# Patient Record
Sex: MALE | Race: WHITE
[De-identification: names, ages, dates, MRNs, and addresses within clinical notes are randomized per-mention and may not be internally consistent; named-entity substitution may affect disease eponyms.]

---

## 2018-06-06 ENCOUNTER — HOSPITAL ENCOUNTER (EMERGENCY)
Dept: HOSPITAL 80 - FED | Age: 73
Discharge: HOME | End: 2018-06-06
Payer: COMMERCIAL

## 2018-06-06 VITALS — DIASTOLIC BLOOD PRESSURE: 69 MMHG | SYSTOLIC BLOOD PRESSURE: 124 MMHG

## 2018-06-06 DIAGNOSIS — G43.809: Primary | ICD-10-CM

## 2018-06-06 DIAGNOSIS — E86.9: ICD-10-CM

## 2018-06-06 LAB — PLATELET # BLD: 195 10^3/UL (ref 150–400)

## 2018-06-06 PROCEDURE — 96375 TX/PRO/DX INJ NEW DRUG ADDON: CPT

## 2018-06-06 PROCEDURE — 96374 THER/PROPH/DIAG INJ IV PUSH: CPT

## 2018-06-06 PROCEDURE — 96361 HYDRATE IV INFUSION ADD-ON: CPT

## 2018-06-06 PROCEDURE — 70450 CT HEAD/BRAIN W/O DYE: CPT

## 2018-06-06 PROCEDURE — 99285 EMERGENCY DEPT VISIT HI MDM: CPT

## 2018-06-06 NOTE — EDPHY
HPI/HX/ROS/PE/MDM


Narrative: 





CHIEF COMPLAINT: Visual changes, headache





HPI: The patient is a 73 y/o male with a history of ocular migraines and sleep 

apnea complaining of 2 ocular migraines today. He usually develops migraines 

when he is dehydrated and reports it has been a long time since he has had an 

ocular migraine. Usually with ocular migraines, he sees bright colors and a a 

"fortress wall" pattern. He awoke today feeling "lousy". This morning, as he 

was leaving his gentle movement training class, he noticed a light shimmering 

in his vision. He sat down and took two aspirins before driving home. On the 

way home he stopped at this ED and sat in the waiting room. His symptoms 

improved in 20 minutes so he continued to drive home. He developed a headache 

on the drive home. This evening, he decided to get takeout. As he was driving 

to the restaurant, he noticed he was having trouble reading road signs, 

starting with his peripheral vision. His left eye was better than his right. 

The blurriness progressed, prompting his visit. He denies any other associated 

symptoms. He reports he has never had two ocular migraines in one day and he 

has never had an ocular migraine without his bright colors and "fortress wall" 

pattern. 





REVIEW OF SYSTEMS:


Aside from elements discussed in the HPI, a comprehensive 10-point review of 

systems was reviewed and is negative.





PMH: Sleep apnea, ocular migraines





SOCIAL HISTORY: , lives in [place], retired, worked at [employer]





PHYSICAL EXAM:


General:Patient is alert, in no acute distress.


ENT:Eyes are normal to inspection.  ENT inspection normal.  No bruit.  PERRL. 

Sclera normal.  EOMI. 


Neck: Normal inspection.  Full range of motion.


Respiratory:No respiratory distress.  Breath sounds normal bilaterally.


Cardiovascular: Regular rate and rhythm.  Strong peripheral pulses.  Normal cap 

refill.


Abdomen:The abdomen is nontender to palpation. There are no peritoneal signs. 

There are normal bowel sounds.


Back: Normal to inspection.  No tenderness to palpation.


Skin: Normal color.  No rash.  Warm and dry.


Extremities: Normal appearance.  Full range of motion.


Neuro: Oriented x3.  Normal motor function.  Normal sensory function. No 

pronator drift.  Normal finger to nose bilaterally.  Normal gait. CNs II-X11 

normal. 








ED Course: 





The patient presents with his second ocular migraine today. Usually he has 

ocular migraines when dehydrated. He reports it is unusual to have two ocular 

migraines in one day. Plan for CBC, basic metabolic panel, head CT, 10 mg 

Reglan IV, 30 mg Toradol IV, 25 mg Benadryl IV, and 1 L IV NS fluids.





8:20 PM- The patient's CT was negative and labs are unremarkable. I reassessed 

him and his symptoms have completely resolved. We discussed option of further 

imaging to include MRI and/or CTA.  The patient declines further testing and 

wants to go home. He understands I cannot 100% stroke or dissection or other 

pathology without further testing. 


MDM: 





This patient presents with two episodes of what appear to be consistent with 

prior ocular migraines. He has no history of trauma or manipulation.  His 

symptoms completely resolved after typical migraine cocktail making SAH, CVA 

quite unlikely.  Ultimately the patient refused further imaging - he 

understands I am unable to fully rule out potentially life-threatening disease 

such as CVA without further testing.  I think his presentation is most 

consistent with ocular migraine. 





- Data Points


Imaging Results: 


 Imaging Impressions





Head CT  06/06/18 19:51


Impression:


1. Negative for hemorrhage or mass lesion.


2. Elderly brain with atrophy and probable white matter small vessel disease. 


 


Results called and discussed with Jim Perea MD on 6/6/2018 at 20:20.


 


 











Laboratory Results: 


 Laboratory Results





 06/06/18 20:13 





 06/06/18 20:13 





 











  06/06/18 06/06/18





  20:13 20:13


 


WBC    4.94 10^3/uL 10^3/uL





    (3.80-9.50) 


 


RBC    4.53 10^6/uL 10^6/uL





    (4.40-6.38) 


 


Hgb    14.5 g/dL g/dL





    (13.7-17.5) 


 


Hct    42.0 % %





    (40.0-51.0) 


 


MCV    92.7 fL fL





    (81.5-99.8) 


 


MCH    32.0 pg pg





    (27.9-34.1) 


 


MCHC    34.5 g/dL g/dL





    (32.4-36.7) 


 


RDW    13.3 % %





    (11.5-15.2) 


 


Plt Count    195 10^3/uL 10^3/uL





    (150-400) 


 


MPV    10.0 fL fL





    (8.7-11.7) 


 


Neut % (Auto)    52.3 % %





    (39.3-74.2) 


 


Lymph % (Auto)    36.4 % %





    (15.0-45.0) 


 


Mono % (Auto)    9.3 % %





    (4.5-13.0) 


 


Eos % (Auto)    1.2 % %





    (0.6-7.6) 


 


Baso % (Auto)    0.8 % %





    (0.3-1.7) 


 


Nucleat RBC Rel Count    0.0 % %





    (0.0-0.2) 


 


Absolute Neuts (auto)    2.58 10^3/uL 10^3/uL





    (1.70-6.50) 


 


Absolute Lymphs (auto)    1.80 10^3/uL 10^3/uL





    (1.00-3.00) 


 


Absolute Monos (auto)    0.46 10^3/uL 10^3/uL





    (0.30-0.80) 


 


Absolute Eos (auto)    0.06 10^3/uL 10^3/uL





    (0.03-0.40) 


 


Absolute Basos (auto)    0.04 10^3/uL 10^3/uL





    (0.02-0.10) 


 


Absolute Nucleated RBC    0.00 10^3/uL 10^3/uL





    (0-0.01) 


 


Immature Gran %    0.0 % %





    (0.0-1.1) 


 


Immature Gran #    0.00 10^3/uL 10^3/uL





    (0.00-0.10) 


 


Sodium  140 mEq/L mEq/L  





   (135-145)  


 


Potassium  4.0 mEq/L mEq/L  





   (3.3-5.0)  


 


Chloride  107 mEq/L mEq/L  





   ()  


 


Carbon Dioxide  22 mEq/l mEq/l  





   (22-31)  


 


Anion Gap  11 mEq/L mEq/L  





   (8-16)  


 


BUN  19 mg/dL mg/dL  





   (7-23)  


 


Creatinine  1.2 mg/dL mg/dL  





   (0.7-1.3)  


 


Estimated GFR  60   





   


 


Glucose  75 mg/dL mg/dL  





   ()  


 


Calcium  8.9 mg/dL mg/dL  





   (8.5-10.4)  











Medications Given: 


 








Discontinued Medications





Diphenhydramine HCl (Benadryl Injection)  25 mg IVP EDNOW ONE


   Stop: 06/06/18 19:51


   Last Admin: 06/06/18 20:21 Dose:  25 mg


Sodium Chloride (Ns)  1,000 mls @ 0 mls/hr IV ONCE ONE; Wide Open


   PRN Reason: Protocol


   Stop: 06/06/18 19:51


   Last Admin: 06/06/18 20:20 Dose:  1,000 mls


Ketorolac Tromethamine (Toradol)  30 mg IVP EDNOW ONE


   Stop: 06/06/18 19:51


   Last Admin: 06/06/18 20:21 Dose:  30 mg


Metoclopramide HCl (Reglan Injection)  10 mg IVP EDNOW ONE


   Stop: 06/06/18 19:51


   Last Admin: 06/06/18 20:22 Dose:  10 mg








General


Time Seen by Provider: 06/06/18 19:34


Initial Vital Signs: 


 Initial Vital Signs











Temperature (C)  37.1 C   06/06/18 19:18


 


Heart Rate  63   06/06/18 19:18


 


Respiratory Rate  16   06/06/18 19:18


 


Blood Pressure  144/94 H  06/06/18 19:18


 


O2 Sat (%)  94   06/06/18 19:18








 











O2 Delivery Mode               Room Air














Allergies/Adverse Reactions: 


 





Milk Containing Products [dairy] Allergy (Verified 06/06/18 19:25)


 


Penicillins Allergy (Verified 06/06/18 19:25)


 








Home Medications: 














 Medication  Instructions  Recorded


 


No Medications [NO HOME 1 ea MISC 04/28/11





MEDICATIONS]  














Departure





- Departure


Disposition: Home, Routine, Self-Care


Clinical Impression: 


 Ocular migraine





Condition: Good


Instructions:  Ocular Migraine (ED)


Additional Instructions: 


1. Follow up with your primary care provider for continued migraines.


2. Return to the emergency department for weakness on one side, difficulty 

speaking, or other worsening of condition. 


Referrals: 


[facility] NAZARIO [Other] - As per Instructions


Report Scribed for: Jim Perea


Report Scribed by: Geni Bonilla


Date of Report: 06/06/18


Time of Report: 20:25


Physician Review and Approval Statement: Portions of this note were transcribed 

by an ED scribe.  I personally performed the history, physical exam, and 

medical decision making; and confirm the accuracy of the information in the 

transcribed note.